# Patient Record
Sex: MALE | Race: WHITE | Employment: UNEMPLOYED | RURAL
[De-identification: names, ages, dates, MRNs, and addresses within clinical notes are randomized per-mention and may not be internally consistent; named-entity substitution may affect disease eponyms.]

---

## 2021-09-21 ENCOUNTER — OFFICE VISIT (OUTPATIENT)
Dept: FAMILY MEDICINE CLINIC | Age: 46
End: 2021-09-21
Payer: MEDICAID

## 2021-09-21 VITALS
HEIGHT: 61 IN | TEMPERATURE: 97.9 F | HEART RATE: 57 BPM | OXYGEN SATURATION: 96 % | DIASTOLIC BLOOD PRESSURE: 86 MMHG | SYSTOLIC BLOOD PRESSURE: 138 MMHG | RESPIRATION RATE: 18 BRPM | WEIGHT: 148.6 LBS | BODY MASS INDEX: 28.05 KG/M2

## 2021-09-21 DIAGNOSIS — H90.3 SENSORINEURAL HEARING LOSS, ASYMMETRICAL: ICD-10-CM

## 2021-09-21 DIAGNOSIS — R91.8 PULMONARY NODULES: ICD-10-CM

## 2021-09-21 DIAGNOSIS — Z72.0 TOBACCO ABUSE: ICD-10-CM

## 2021-09-21 DIAGNOSIS — I10 HTN (HYPERTENSION), BENIGN: Primary | ICD-10-CM

## 2021-09-21 PROCEDURE — 99204 OFFICE O/P NEW MOD 45 MIN: CPT | Performed by: FAMILY MEDICINE

## 2021-09-21 RX ORDER — LISINOPRIL 10 MG/1
10 TABLET ORAL DAILY
Qty: 30 TABLET | Refills: 2 | Status: SHIPPED | OUTPATIENT
Start: 2021-09-21 | End: 2021-12-08

## 2021-09-21 NOTE — PROGRESS NOTES
1. Have you been to the ER, urgent care clinic since your last visit? Hospitalized since your last visit? No    2. Have you seen or consulted any other health care providers outside of the [address] [place] [address] since your last visit? Include any pap smears or colon screening. No    Reviewed record in preparation for visit and have necessary documentation  Goals that were addressed and/or need to be completed during or after this appointment include     Health Maintenance Due   Topic Date Due    COVID-19 Vaccine (1) Never done    DTaP/Tdap/Td series (1 - Tdap) Never done    Lipid Screen  Never done    Colorectal Cancer Screening Combo  Never done    Flu Vaccine (1) Never done       Patient is accompanied by self I have received verbal consent from Mariann Ibarra to discuss any/all medical information while they are present in the room. Patient declined Flu Vaccine.

## 2021-09-22 LAB
ALBUMIN SERPL-MCNC: 4.4 G/DL (ref 4–5)
ALBUMIN/GLOB SERPL: 1.6 {RATIO} (ref 1.2–2.2)
ALP SERPL-CCNC: 91 IU/L (ref 44–121)
ALT SERPL-CCNC: 15 IU/L (ref 0–44)
AST SERPL-CCNC: 23 IU/L (ref 0–40)
BILIRUB SERPL-MCNC: <0.2 MG/DL (ref 0–1.2)
BUN SERPL-MCNC: 11 MG/DL (ref 6–24)
BUN/CREAT SERPL: 13 (ref 9–20)
CALCIUM SERPL-MCNC: 9.8 MG/DL (ref 8.7–10.2)
CHLORIDE SERPL-SCNC: 104 MMOL/L (ref 96–106)
CHOLEST SERPL-MCNC: 175 MG/DL (ref 100–199)
CO2 SERPL-SCNC: 22 MMOL/L (ref 20–29)
CREAT SERPL-MCNC: 0.86 MG/DL (ref 0.76–1.27)
GLOBULIN SER CALC-MCNC: 2.7 G/DL (ref 1.5–4.5)
GLUCOSE SERPL-MCNC: 88 MG/DL (ref 65–99)
HDLC SERPL-MCNC: 54 MG/DL
LDLC SERPL CALC-MCNC: 97 MG/DL (ref 0–99)
POTASSIUM SERPL-SCNC: 5 MMOL/L (ref 3.5–5.2)
PROT SERPL-MCNC: 7.1 G/DL (ref 6–8.5)
SODIUM SERPL-SCNC: 141 MMOL/L (ref 134–144)
TRIGL SERPL-MCNC: 135 MG/DL (ref 0–149)
TSH SERPL DL<=0.005 MIU/L-ACNC: 2.77 UIU/ML (ref 0.45–4.5)
VLDLC SERPL CALC-MCNC: 24 MG/DL (ref 5–40)

## 2021-09-23 PROBLEM — R91.8 PULMONARY NODULES: Status: ACTIVE | Noted: 2021-09-23

## 2021-09-23 PROBLEM — H90.3 SENSORINEURAL HEARING LOSS, ASYMMETRICAL: Status: ACTIVE | Noted: 2021-09-23

## 2021-09-23 PROBLEM — I10 HTN (HYPERTENSION), BENIGN: Status: ACTIVE | Noted: 2021-09-23

## 2021-09-23 PROBLEM — Z72.0 TOBACCO ABUSE: Status: ACTIVE | Noted: 2021-09-23

## 2021-09-23 NOTE — PROGRESS NOTES
Progress Note    Patient: Joselyn Torrez MRN: [id number]  SSN: [id number]    YOB: 1975  Age: 51 y.o. Sex: male        Chief Complaint   Patient presents with    New Patient     he is a 51y.o. year old male who presents to \[facility]\"" care. Patient with hx of HTN, hearing loss and tobacco abuse. Patient denies HA, dizziness, SOB, CP, abdominal pain, dysuria, acute myalgias or arthralgias. Encounter Diagnoses   Name Primary?  HTN (hypertension), benign Yes    Sensorineural hearing loss, asymmetrical     Pulmonary nodules        There is no problem list on file for this patient. Past Surgical History:   Procedure Laterality Date    HX GI      NEUROLOGICAL PROCEDURE UNLISTED      AK CHEST SURGERY PROCEDURE UNLISTED       Social History     Socioeconomic History    Marital status: SINGLE     Spouse name: Not on file    Number of children: Not on file    Years of education: Not on file    Highest education level: Not on file   Occupational History    Not on file   Tobacco Use    Smoking status: Current Some Day Smoker     Packs/day: 0.50     Types: Cigarettes    Smokeless tobacco: Never Used    Tobacco comment: half a pack a day. Smoker for 10 years   Vaping Use    Vaping Use: Never used   Substance and Sexual Activity    Alcohol use: No    Drug use: No    Sexual activity: Not Currently     Partners: Female   Other Topics Concern    Not on file   Social History Narrative    Not on file     Social Determinants of Health     Financial Resource Strain:     Difficulty of Paying Living Expenses:    Food Insecurity:     Worried About Running Out of Food in the Last Year:     [address] in the Last Year:    Transportation Needs:     Lack of Transportation (Medical):      Lack of Transportation (Non-Medical):    Physical Activity:     Days of Exercise per Week:     Minutes of Exercise per Session:    Stress:     Feeling of Stress :    Social Connections:     Frequency of Communication with Friends and Family:     Frequency of Social Gatherings with Friends and Family:     Attends Spiritism Services:     Active Member of Clubs or Organizations:     Attends Club or Organization Meetings:     Marital Status:    Intimate Partner Violence:     Fear of Current or Ex-Partner:     Emotionally Abused:     Physically Abused:     Sexually Abused:      Family History   Problem Relation Age of Onset    Arthritis-osteo Mother     Headache Mother     Alcohol abuse Sister     Asthma Sister     Bleeding Prob Sister     Cancer Sister     Mental Retardation Sister     Psychiatric Disorder Sister      Current Outpatient Medications   Medication Sig    lisinopriL (PRINIVIL, ZESTRIL) 10 mg tablet Take 1 Tablet by mouth daily. No current facility-administered medications for this visit.      Allergies   Allergen Reactions    Clindamycin Rash    Epinephrine Other (comments)    Lidocaine Other (comments)    Penicillins Other (comments)    Sulfa (Sulfonamide Antibiotics) Rash    Vibramycin [Doxycycline Hyclate] Rash       Review of Systems:  Constitutional: Negative for fatigue, malaise  Resp: Negative for cough, wheezing or SOB  CV: Negative for chest pain, dizziness or palpitations  GI: Negative for nausea or abdominal pain  MS: Negative for acute myalgias or arthralgias   Neuro: Negative for HA, weakness or paresthesia  Psych: Negative for depression or anxiety     Vitals:    09/21/21 1109   BP: 138/86   Pulse: (!) 57   Resp: 18   Temp: 97.9 °F (36.6 °C)   TempSrc: Oral   SpO2: 96%   Weight: 148 lb 9.6 oz (67.4 kg)   Height: 5' 1\" (1.549 m)       Physical Examination:  General: Well developed, well nourished, in no acute distress  Head: Normocephalic, atraumatic  Eyes: Sclera clear, EOMI  Neck: Normal range of motion  Respiratory: symmetrical, unlabored effort  Cardiovascular: Regular rate and rhythm  Extremities: Full range of motion, normal gait  Neurologic: No focal deficits  Psych: Active, alert and oriented. Affect appropriate       ICD-10-CM ICD-9-CM    1. HTN (hypertension), benign  I10 401.1 LIPID PANEL      METABOLIC PANEL, COMPREHENSIVE      TSH 3RD GENERATION      TSH 3RD GENERATION      METABOLIC PANEL, COMPREHENSIVE      LIPID PANEL   2. Sensorineural hearing loss, asymmetrical  H90.5 389.16    3. Pulmonary nodules  R91.8 793.19 CT CHEST WO CONT       Plan of care:  Diagnoses were discussed in detail with patient. Medication risks/benefits/side effects discussed with patient. All of the patient's questions were addressed and answered to apparent satisfaction. The patient understands and agrees with our plan of care. The patient knows to call back if they have questions about the plan of care or if symptoms change. The patient received an After-Visit Summary which contains VS, diagnoses, orders, allergy and medication lists. No future appointments. Follow-up and Dispositions    · Return in about 6 months (around 3/21/2022).

## 2021-10-04 ENCOUNTER — PATIENT MESSAGE (OUTPATIENT)
Dept: FAMILY MEDICINE CLINIC | Age: 46
End: 2021-10-04

## 2021-10-11 ENCOUNTER — OFFICE VISIT (OUTPATIENT)
Dept: FAMILY MEDICINE CLINIC | Age: 46
End: 2021-10-11
Payer: MEDICAID

## 2021-10-11 VITALS
RESPIRATION RATE: 18 BRPM | BODY MASS INDEX: 26.96 KG/M2 | OXYGEN SATURATION: 95 % | TEMPERATURE: 97.2 F | WEIGHT: 142.8 LBS | HEART RATE: 80 BPM | SYSTOLIC BLOOD PRESSURE: 128 MMHG | DIASTOLIC BLOOD PRESSURE: 87 MMHG | HEIGHT: 61 IN

## 2021-10-11 DIAGNOSIS — H90.3 SENSORINEURAL HEARING LOSS, ASYMMETRICAL: ICD-10-CM

## 2021-10-11 DIAGNOSIS — I10 HTN (HYPERTENSION), BENIGN: Primary | ICD-10-CM

## 2021-10-11 DIAGNOSIS — G89.4 CHRONIC PAIN SYNDROME: ICD-10-CM

## 2021-10-11 DIAGNOSIS — Z20.822 CONTACT WITH AND (SUSPECTED) EXPOSURE TO COVID-19: ICD-10-CM

## 2021-10-11 PROBLEM — R91.8 PULMONARY NODULES: Chronic | Status: ACTIVE | Noted: 2021-09-23

## 2021-10-11 PROBLEM — Z23 ENCOUNTER FOR IMMUNIZATION: Status: ACTIVE | Noted: 2021-10-11

## 2021-10-11 PROBLEM — Z23 ENCOUNTER FOR IMMUNIZATION: Chronic | Status: ACTIVE | Noted: 2021-10-11

## 2021-10-11 PROBLEM — Z72.0 TOBACCO ABUSE: Chronic | Status: ACTIVE | Noted: 2021-09-23

## 2021-10-11 PROCEDURE — 99214 OFFICE O/P EST MOD 30 MIN: CPT | Performed by: FAMILY MEDICINE

## 2021-10-11 RX ORDER — BISMUTH SUBSALICYLATE 262 MG
1 TABLET,CHEWABLE ORAL DAILY
COMMUNITY

## 2021-10-11 RX ORDER — MELOXICAM 7.5 MG/1
7.5 TABLET ORAL DAILY
Qty: 30 TABLET | Refills: 1 | Status: SHIPPED | OUTPATIENT
Start: 2021-10-11 | End: 2021-10-29 | Stop reason: DRUGHIGH

## 2021-10-11 RX ORDER — MULTIVIT WITH MINERALS/HERBS
1 TABLET ORAL DAILY
COMMUNITY

## 2021-10-11 NOTE — PROGRESS NOTES
1. Have you been to the ER, urgent care clinic since your last visit? Hospitalized since your last visit? No    2. Have you seen or consulted any other health care providers outside of the [address] [place] [address] since your last visit? Include any pap smears or colon screening. No    Reviewed record in preparation for visit and have necessary documentation  Goals that were addressed and/or need to be completed during or after this appointment include     Health Maintenance Due   Topic Date Due    Hepatitis C Screening  Never done    Pneumococcal 0-64 years (1 of 2 - PPSV23) Never done    COVID-19 Vaccine (1) Never done    DTaP/Tdap/Td series (1 - Tdap) Never done    Colorectal Cancer Screening Combo  Never done    Flu Vaccine (1) Never done       Patient is accompanied by self I have received verbal consent from Jazzmine Armstrong to discuss any/all medical information while they are present in the room.

## 2021-10-11 NOTE — PROGRESS NOTES
[facility]    History of Present Illness:   Jez Newton is a 51 y.o. male with history of HTN, Hearing Loss, Pulmonary Nodules  CC: Follow up. History provided by patient and Records    HPI:  COVID -Contact: patient is quarantined at home with family members with COVID-19. Patient is asymptomatic but patient also had COVID earlier this year. Just wants to make sure. Pulmonary Nodules: History of nodules, needs to get CT scan. Patient does not believe has been called for imaging. Patient does smoke. Patient had been seeing [facility], getting paperwork. Chronic Pain: Patient reports chronic issues with back pains and myalgias. Not taking any medication. Noting 7/10 aching back pains that are daily and constant. Noting also intermittent pains and tingling in the left hand for a bout a week now. Hearing Loss: Getting Hearing aids done. Insomnia: Dr. Corky Valdivia of [facility]) prescribed Seroquel. Health Maintenance  Health Maintenance Due   Topic Date Due    Hepatitis C Screening  Never done    Pneumococcal 0-64 years (1 of 2 - PPSV23) Never done    COVID-19 Vaccine (1) Never done    DTaP/Tdap/Td series (1 - Tdap) Never done    Colorectal Cancer Screening Combo  Never done    Flu Vaccine (1) Never done       Past Medical, Family, and Social History:     Current Outpatient Medications on File Prior to Visit   Medication Sig Dispense Refill    multivitamin (ONE A DAY) tablet Take 1 Tablet by mouth daily.  b complex vitamins tablet Take 1 Tablet by mouth daily.  lisinopriL (PRINIVIL, ZESTRIL) 10 mg tablet Take 1 Tablet by mouth daily. 30 Tablet 2     No current facility-administered medications on file prior to visit.        Patient Active Problem List   Diagnosis Code    HTN (hypertension), benign I10    Sensorineural hearing loss, asymmetrical H90.3    Pulmonary nodules R91.8    Tobacco abuse Z72.0    Encounter for immunization Z23       Social History     Socioeconomic History    Marital status: SINGLE     Spouse name: Not on file    Number of children: Not on file    Years of education: Not on file    Highest education level: Not on file   Occupational History    Not on file   Tobacco Use    Smoking status: Current Some Day Smoker     Packs/day: 0.50     Types: Cigarettes    Smokeless tobacco: Never Used    Tobacco comment: half a pack a day. Smoker for 10 years   Vaping Use    Vaping Use: Never used   Substance and Sexual Activity    Alcohol use: No    Drug use: No    Sexual activity: Not Currently     Partners: Female   Other Topics Concern    Not on file   Social History Narrative    - [employer]idy [employer] all [employer]     Social Determinants of Health     Financial Resource Strain:     Difficulty of Paying Living Expenses:    Food Insecurity:     Worried About [address] in the Last Year:     [address] in the Last Year:    Transportation Needs:     Lack of Transportation (Medical):  Lack of Transportation (Non-Medical):    Physical Activity:     Days of Exercise per Week:     Minutes of Exercise per Session:    Stress:     Feeling of Stress :    Social Connections:     Frequency of Communication with Friends and Family:     Frequency of Social Gatherings with Friends and Family:     Attends Mu-ism Services:     Active Member of Clubs or Organizations:     Attends Club or Organization Meetings:     Marital Status:    Intimate Partner Violence:     Fear of Current or Ex-Partner:     Emotionally Abused:     Physically Abused:     Sexually Abused:        Review of Systems   Review of Systems   Constitutional: Negative for chills and fever. Cardiovascular: Negative for chest pain and palpitations. Gastrointestinal: Negative for nausea and vomiting. Musculoskeletal: Positive for back pain and joint pain. Psychiatric/Behavioral: The patient has insomnia.         Objective:     Visit Vitals  BP 128/87 (BP 1 Location: Right arm, BP Patient Position: Sitting, BP Cuff Size: Adult)   Pulse 80   Temp 97.2 °F (36.2 °C) (Oral)   Resp 18   Ht 5' 1\" (1.549 m)   Wt 142 lb 12.8 oz (64.8 kg)   SpO2 95%   BMI 26.98 kg/m²        Physical Exam  Vitals and nursing note reviewed. Constitutional:       Appearance: Normal appearance. HENT:      Head: Normocephalic and atraumatic. Cardiovascular:      Rate and Rhythm: Normal rate and regular rhythm. Pulses: Normal pulses. Heart sounds: Normal heart sounds. No murmur heard. No friction rub. No gallop. Pulmonary:      Effort: Pulmonary effort is normal.      Breath sounds: Normal breath sounds. Abdominal:      General: Abdomen is flat. Bowel sounds are normal.      Palpations: Abdomen is soft. Musculoskeletal:      Cervical back: Normal range of motion and neck supple. Skin:     General: Skin is warm and dry. Neurological:      Mental Status: He is alert. Pertinent Labs/Studies:      Assessment and orders:       ICD-10-CM ICD-9-CM    1. HTN (hypertension), benign  I10 401.1    2. Contact with and (suspected) exposure to covid-19  Z20.822 V01.79 NOVEL CORONAVIRUS (COVID-19)   3. Sensorineural hearing loss, asymmetrical  H90.3 389.16    4. Chronic pain syndrome  G89.4 338.4 meloxicam (MOBIC) 7.5 mg tablet     Diagnoses and all orders for this visit:    1. HTN (hypertension), benign: Controlled, Getting records    2. Contact with and (suspected) exposure to covid-19: Screening labs  -     NOVEL CORONAVIRUS (COVID-19)    3. Sensorineural hearing loss, asymmetrical    4. Chronic pain syndrome: Trial of Mobic  -     meloxicam (MOBIC) 7.5 mg tablet; Take 1 Tablet by mouth daily. Follow-up and Dispositions    · Return in about 3 months (around 1/11/2022). I have discussed the diagnosis with the patient and the intended plan as seen in the above orders. Social history, medical history, and labs were reviewed.   The patient has received an after-visit summary and questions were answered concerning future plans. I have discussed medication side effects and warnings with the patient as well.     MD JEFFREY Geiger & RENETTA COOK [facility]  10/11/21

## 2021-10-13 LAB
SARS-COV-2, NAA 2 DAY TAT: NORMAL
SARS-COV-2, NAA: NOT DETECTED

## 2021-10-15 ENCOUNTER — HOSPITAL ENCOUNTER (OUTPATIENT)
Dept: CT IMAGING | Age: 46
Discharge: HOME | End: 2021-10-15
Attending: FAMILY MEDICINE
Payer: MEDICAID

## 2021-10-15 DIAGNOSIS — R91.8 PULMONARY NODULES: ICD-10-CM

## 2021-10-15 PROCEDURE — 71250 CT THORAX DX C-: CPT

## 2021-10-29 ENCOUNTER — PATIENT MESSAGE (OUTPATIENT)
Dept: FAMILY MEDICINE CLINIC | Age: 46
End: 2021-10-29

## 2021-10-29 DIAGNOSIS — G89.4 CHRONIC PAIN SYNDROME: Primary | ICD-10-CM

## 2021-10-29 RX ORDER — MELOXICAM 15 MG/1
15 TABLET ORAL DAILY
Qty: 30 TABLET | Refills: 1 | Status: SHIPPED | OUTPATIENT
Start: 2021-10-29 | End: 2021-10-29

## 2021-10-29 RX ORDER — MELOXICAM 15 MG/1
15 TABLET ORAL DAILY
Qty: 30 TABLET | Refills: 1 | Status: SHIPPED | OUTPATIENT
Start: 2021-10-29 | End: 2021-12-06

## 2021-10-29 NOTE — TELEPHONE ENCOUNTER
From: Bahman Marie  To: Aden Hinojosa MD  Sent: 10/29/2021 3:33 PM EDT  Subject: Prescription Question    Can you please order me a higher dose of Meloxicam from 7.5 as I'm taking 2 a day now per your advice and I now have 7 pills left, enough for 3 more days? I believe you would have to order this for me,then I can ask them to refill it. Thank you Dr Neda Norton. Have a wonderful day and a great weekend.

## 2021-10-29 NOTE — TELEPHONE ENCOUNTER
I have called in the increased Meloxicam dosage. I am not sure what to say about the Urination, but given it is chronic the most likely causes include prostate and diet. There are other symptoms related to BPH though including dificulty starting to urinate, small amounts, and others.     MD JEFFREY Mas & RENETTA COOK [facility]  10/29/21

## 2021-11-01 ENCOUNTER — TELEPHONE (OUTPATIENT)
Dept: FAMILY MEDICINE CLINIC | Age: 46
End: 2021-11-01

## 2021-11-01 NOTE — TELEPHONE ENCOUNTER
Pharmacy denied refill because they stated his insurance said it was to early to refill. Dr. Carolina Mccormick sent in a request to the old pharmacy of [facility] but pt changed pharmacies. [place] will call [facility] and if patient did not  med, they will fill. Robin Duque will call patient to update.

## 2022-03-18 PROBLEM — I10 HTN (HYPERTENSION), BENIGN: Status: ACTIVE | Noted: 2021-09-23

## 2022-03-18 PROBLEM — Z72.0 TOBACCO ABUSE: Status: ACTIVE | Noted: 2021-09-23

## 2022-03-19 PROBLEM — R91.8 PULMONARY NODULES: Status: ACTIVE | Noted: 2021-09-23

## 2022-03-19 PROBLEM — Z23 ENCOUNTER FOR IMMUNIZATION: Status: ACTIVE | Noted: 2021-10-11

## 2022-03-20 PROBLEM — H90.3 SENSORINEURAL HEARING LOSS, ASYMMETRICAL: Status: ACTIVE | Noted: 2021-09-23

## 2023-05-25 RX ORDER — MELOXICAM 15 MG/1
15 TABLET ORAL DAILY
COMMUNITY
Start: 2021-12-06

## 2023-05-25 RX ORDER — LISINOPRIL 10 MG/1
10 TABLET ORAL DAILY
COMMUNITY
Start: 2021-12-08

## 2024-06-10 SDOH — HEALTH STABILITY: PHYSICAL HEALTH: ON AVERAGE, HOW MANY MINUTES DO YOU ENGAGE IN EXERCISE AT THIS LEVEL?: 150+ MIN

## 2024-06-10 SDOH — HEALTH STABILITY: PHYSICAL HEALTH: ON AVERAGE, HOW MANY DAYS PER WEEK DO YOU ENGAGE IN MODERATE TO STRENUOUS EXERCISE (LIKE A BRISK WALK)?: 4 DAYS

## 2024-06-13 ENCOUNTER — OFFICE VISIT (OUTPATIENT)
Facility: CLINIC | Age: 49
End: 2024-06-13
Payer: MEDICAID

## 2024-06-13 VITALS
RESPIRATION RATE: 18 BRPM | OXYGEN SATURATION: 96 % | SYSTOLIC BLOOD PRESSURE: 139 MMHG | DIASTOLIC BLOOD PRESSURE: 87 MMHG | HEIGHT: 61 IN | WEIGHT: 146.6 LBS | HEART RATE: 98 BPM | TEMPERATURE: 98.9 F | BODY MASS INDEX: 27.68 KG/M2

## 2024-06-13 DIAGNOSIS — Z72.0 TOBACCO ABUSE: ICD-10-CM

## 2024-06-13 DIAGNOSIS — J06.9 VIRAL URI: ICD-10-CM

## 2024-06-13 DIAGNOSIS — I10 HTN (HYPERTENSION), BENIGN: Primary | ICD-10-CM

## 2024-06-13 DIAGNOSIS — Z76.89 ENCOUNTER TO ESTABLISH CARE: ICD-10-CM

## 2024-06-13 DIAGNOSIS — M25.50 ARTHRALGIA, UNSPECIFIED JOINT: ICD-10-CM

## 2024-06-13 DIAGNOSIS — E78.2 MIXED HYPERLIPIDEMIA: ICD-10-CM

## 2024-06-13 PROCEDURE — 3075F SYST BP GE 130 - 139MM HG: CPT | Performed by: FAMILY MEDICINE

## 2024-06-13 PROCEDURE — 3079F DIAST BP 80-89 MM HG: CPT | Performed by: FAMILY MEDICINE

## 2024-06-13 PROCEDURE — 99204 OFFICE O/P NEW MOD 45 MIN: CPT | Performed by: FAMILY MEDICINE

## 2024-06-13 RX ORDER — BENZONATATE 200 MG/1
200 CAPSULE ORAL 3 TIMES DAILY PRN
Qty: 30 CAPSULE | Refills: 0 | Status: SHIPPED | OUTPATIENT
Start: 2024-06-13

## 2024-06-13 RX ORDER — ATORVASTATIN CALCIUM 20 MG/1
20 TABLET, FILM COATED ORAL DAILY
COMMUNITY
End: 2024-06-13 | Stop reason: SDUPTHER

## 2024-06-13 RX ORDER — ATORVASTATIN CALCIUM 20 MG/1
20 TABLET, FILM COATED ORAL DAILY
Qty: 90 TABLET | Refills: 1 | Status: SHIPPED | OUTPATIENT
Start: 2024-06-13

## 2024-06-13 RX ORDER — AMLODIPINE BESYLATE 10 MG/1
10 TABLET ORAL DAILY
COMMUNITY
End: 2024-06-13 | Stop reason: SDUPTHER

## 2024-06-13 RX ORDER — MELOXICAM 15 MG/1
15 TABLET ORAL DAILY
Qty: 90 TABLET | Refills: 1 | Status: SHIPPED | OUTPATIENT
Start: 2024-06-13

## 2024-06-13 RX ORDER — AMLODIPINE BESYLATE 10 MG/1
10 TABLET ORAL DAILY
Qty: 90 TABLET | Refills: 1 | Status: SHIPPED | OUTPATIENT
Start: 2024-06-13

## 2024-06-13 ASSESSMENT — PATIENT HEALTH QUESTIONNAIRE - PHQ9
SUM OF ALL RESPONSES TO PHQ9 QUESTIONS 1 & 2: 0
SUM OF ALL RESPONSES TO PHQ QUESTIONS 1-9: 0
SUM OF ALL RESPONSES TO PHQ QUESTIONS 1-9: 0
1. LITTLE INTEREST OR PLEASURE IN DOING THINGS: NOT AT ALL
SUM OF ALL RESPONSES TO PHQ QUESTIONS 1-9: 0
SUM OF ALL RESPONSES TO PHQ QUESTIONS 1-9: 0
2. FEELING DOWN, DEPRESSED OR HOPELESS: NOT AT ALL

## 2024-06-13 ASSESSMENT — ENCOUNTER SYMPTOMS
ABDOMINAL PAIN: 0
CHEST TIGHTNESS: 0
BACK PAIN: 0

## 2024-06-13 NOTE — PROGRESS NOTES
\"Have you been to the ER, urgent care clinic since your last visit?  Hospitalized since your last visit?\"    New Pat    “Have you seen or consulted any other health care providers outside of [facility] since your last visit?”    New Pat      “Have you had a colorectal cancer screening such as a colonoscopy/FIT/Cologuard?    YES - Type: Colonoscopy - Where:  Nurse/CMA to request most recent records if not in the chart     No colonoscopy on file  No cologuard on file  No FIT/FOBT on file   No flexible sigmoidoscopy on file         Click Here for Release of Records Request   
labs were reviewed.  The patient has received an after-visit summary and questions were answered concerning future plans.  I have discussed medication side effects and warnings with the patient as well.    Lazaro Stallings MD  [facility]  06/13/24

## 2024-10-14 ENCOUNTER — COMMUNITY OUTREACH (OUTPATIENT)
Facility: CLINIC | Age: 49
End: 2024-10-14